# Patient Record
Sex: FEMALE | Race: WHITE | NOT HISPANIC OR LATINO | Employment: FULL TIME | URBAN - METROPOLITAN AREA
[De-identification: names, ages, dates, MRNs, and addresses within clinical notes are randomized per-mention and may not be internally consistent; named-entity substitution may affect disease eponyms.]

---

## 2020-02-10 ENCOUNTER — TELEPHONE (OUTPATIENT)
Dept: SURGERY | Facility: CLINIC | Age: 50
End: 2020-02-10

## 2020-02-10 NOTE — TELEPHONE ENCOUNTER
Holding appt for patient with breast surgery and plastic surgery. Left voicemail for patient to return call

## 2020-02-12 ENCOUNTER — OFFICE VISIT (OUTPATIENT)
Dept: PLASTIC SURGERY | Facility: CLINIC | Age: 50
End: 2020-02-12
Payer: OTHER GOVERNMENT

## 2020-02-12 ENCOUNTER — OFFICE VISIT (OUTPATIENT)
Dept: SURGERY | Facility: CLINIC | Age: 50
End: 2020-02-12
Payer: OTHER GOVERNMENT

## 2020-02-12 VITALS
DIASTOLIC BLOOD PRESSURE: 72 MMHG | WEIGHT: 157.06 LBS | BODY MASS INDEX: 25.24 KG/M2 | SYSTOLIC BLOOD PRESSURE: 106 MMHG | HEART RATE: 89 BPM | HEIGHT: 66 IN | TEMPERATURE: 98 F

## 2020-02-12 DIAGNOSIS — Z85.3 PERSONAL HISTORY OF BREAST CANCER: Primary | ICD-10-CM

## 2020-02-12 PROCEDURE — 99203 OFFICE O/P NEW LOW 30 MIN: CPT | Mod: S$PBB,,, | Performed by: SURGERY

## 2020-02-12 PROCEDURE — 99203 PR OFFICE/OUTPT VISIT, NEW, LEVL III, 30-44 MIN: ICD-10-PCS | Mod: S$PBB,,, | Performed by: SURGERY

## 2020-02-12 PROCEDURE — 99999 PR PBB SHADOW E&M-EST. PATIENT-LVL I: CPT | Mod: PBBFAC,,, | Performed by: SURGERY

## 2020-02-12 PROCEDURE — 99213 OFFICE O/P EST LOW 20 MIN: CPT | Mod: PBBFAC | Performed by: SURGERY

## 2020-02-12 PROCEDURE — 99999 PR PBB SHADOW E&M-EST. PATIENT-LVL III: CPT | Mod: PBBFAC,,, | Performed by: SURGERY

## 2020-02-12 PROCEDURE — 99204 PR OFFICE/OUTPT VISIT, NEW, LEVL IV, 45-59 MIN: ICD-10-PCS | Mod: S$PBB,,, | Performed by: SURGERY

## 2020-02-12 PROCEDURE — 99999 PR PBB SHADOW E&M-EST. PATIENT-LVL III: ICD-10-PCS | Mod: PBBFAC,,, | Performed by: SURGERY

## 2020-02-12 PROCEDURE — 99211 OFF/OP EST MAY X REQ PHY/QHP: CPT | Mod: PBBFAC,27 | Performed by: SURGERY

## 2020-02-12 PROCEDURE — 99204 OFFICE O/P NEW MOD 45 MIN: CPT | Mod: S$PBB,,, | Performed by: SURGERY

## 2020-02-12 PROCEDURE — 99999 PR PBB SHADOW E&M-EST. PATIENT-LVL I: ICD-10-PCS | Mod: PBBFAC,,, | Performed by: SURGERY

## 2020-02-12 NOTE — LETTER
February 20, 2020      Sam Morse, DO  [address] [facility]  [address]  [employer]  [place] [address]           [facility]  [address]  [facility] [address]  Phone: [phone]  Fax: [phone]          Patient: Ivon Carrillo   MR Number: [id number]   YOB: 1970   Date of Visit: 2/12/2020       Dear Dr. Sam Morse:    Thank you for referring Ivon Carrillo to me for evaluation. Attached you will find relevant portions of my assessment and plan of care.    If you have questions, please do not hesitate to call me. I look forward to following Ivon Carrillo along with you.    Sincerely,    Gracie Lucas MD      Enclosure  CC:  No Recipients    If you would like to receive this communication electronically, please contact [email or web address] or [phone] to request more information on [employer] access.    For providers and/or their staff who would like to refer a patient to [facility], please contact us through our one-stop-shop provider referral line, [facility], at [phone].    If you feel you have received this communication in error or would no longer like to receive these types of communications, please e-mail [email or web address]

## 2020-02-12 NOTE — PROGRESS NOTES
New Breast Cancer  History and Physical  [facility]  Department of Surgery    REFERRING PROVIDER: Sam Morse, DO  [address] [facility]  [address]  [place] [address]    CHIEF COMPLAINT: PMH of breast cancer    Subjective:      Ivon Carrillo is a 49 y.o. postmenopausal female referred for surgical evaluation. 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 She underwent a lumpectomy + radiation without endocrine therapy. In 2020 they found two spots of calcifications in the left breast near the lumpectomy site, she underwent a biopsy that came back as fat necrosis. Genetic testing was done, results are pending. She is interested in a bilateral mastectomy with reconstruction, she feels anxious every time she gets a mammogram. Strong family history of breast cancer in her father side. She is also unhappy with the asymmetry following her lumpectomy.    Patient does not routinely do self breast exams.  Patient does not have noted a change on breast exam.  Patient denies nipple discharge. Patient has to previous breast biopsy. Patient has a personal history of breast cancer.    GYN History:  Age of menarche was 18. Age of menopause was 48 (surgical).  Patient denies hormonal therapy. Patient is . Age of first live birth was 20. Patient did breast feed.    FAMILY History:  Maternal grandmother had thyroid cancer at 59  Maternal grandfather had brain cancer  Paternal aunt had breast cancer   Paternal cousin with breast cancer     Past Medical History:   Diagnosis Date    Breast cancer     IBS (irritable bowel syndrome)      Past Surgical History:   Procedure Laterality Date    BREAST BIOPSY      BREAST LUMPECTOMY      CHOLECYSTECTOMY      HAND SURGERY      HYSTERECTOMY       Current Outpatient Medications on File Prior to Visit   Medication Sig Dispense Refill    Lactobacillus rhamnosus GG (CULTURELLE) 10 billion cell capsule Take 1 capsule by mouth once  daily.      multivitamin capsule Take 1 capsule by mouth once daily.       No current facility-administered medications on file prior to visit.      Social History     Socioeconomic History    Marital status:      Spouse name: Not on file    Number of children: Not on file    Years of education: Not on file    Highest education level: Not on file   Occupational History    Not on file   Social Needs    Financial resource strain: Not on file    Food insecurity:     Worry: Not on file     Inability: Not on file    Transportation needs:     Medical: Not on file     Non-medical: Not on file   Tobacco Use    Smoking status: Never Smoker    Smokeless tobacco: Never Used   Substance and Sexual Activity    Alcohol use: Not Currently    Drug use: Never    Sexual activity: Not on file   Lifestyle    Physical activity:     Days per week: Not on file     Minutes per session: Not on file    Stress: Not on file   Relationships    Social connections:     Talks on phone: Not on file     Gets together: Not on file     Attends Mandaen service: Not on file     Active member of club or organization: Not on file     Attends meetings of clubs or organizations: Not on file     Relationship status: Not on file   Other Topics Concern    Not on file   Social History Narrative    Not on file     Family History   Problem Relation Age of Onset    Parkinsonism Father     Thyroid cancer Maternal Grandmother     Brain cancer Maternal Grandfather     Parkinsonism Paternal Grandmother     Stroke Paternal Grandfather         Review of Systems  Review of Systems   Constitutional: Negative for activity change and appetite change.   HENT: Negative for congestion.    Respiratory: Negative for apnea and choking.    Cardiovascular: Positive for palpitations. Negative for chest pain and leg swelling.   Gastrointestinal: Negative for abdominal distention and abdominal pain.   Endocrine: Negative for cold intolerance and heat  "intolerance.   Musculoskeletal: Negative for arthralgias and back pain.   Allergic/Immunologic: Negative for environmental allergies and food allergies.   Neurological: Negative for dizziness and facial asymmetry.   Psychiatric/Behavioral: Negative for agitation and behavioral problems.        Objective:   PHYSICAL EXAM:  /72 (BP Location: Left arm, Patient Position: Sitting, BP Method: Medium (Automatic))   Pulse 89   Temp 98 °F (36.7 °C) (Oral)   Ht 5' 6" (1.676 m)   Wt 71.2 kg (157 lb 1.2 oz)   BMI 25.35 kg/m²     Physical Exam   Constitutional: She appears well-developed and well-nourished.   HENT:   Head: Normocephalic.   Eyes: No scleral icterus.   Neck: Neck supple. No tracheal deviation present.   Cardiovascular: Normal rate and regular rhythm.    Pulmonary/Chest: Breath sounds normal. No respiratory distress. Right breast exhibits no inverted nipple, no mass, no nipple discharge and no skin change. Left breast exhibits no inverted nipple, no mass, no nipple discharge and no skin change.       Abdominal: Soft. She exhibits no mass. There is no tenderness.   Musculoskeletal: She exhibits no edema.   Lymphadenopathy:     She has no cervical adenopathy.   Neurological: She is alert.   Skin: No rash noted. No erythema.     Psychiatric: She has a normal mood and affect.         Radiology review: Images personally reviewed by me in the clinic.   Outside images Mammogram:  Two spots of calcifications seen in left breast. BIRADS 3.   Will have these reviewed her outside interpretation by our radiologist.    Assessment:      Ivon Carrillo is a 49 y.o. postmenopausal female with past medical history of left breast DCIS treated with lumpectomy and radiation.      Plan:   1. Will get a breast MRI since patient is under 50 and has a history of breast cancer.  This is appropriate for routine screening annually per ACR recommendations, but would require this in preparation for surgical planning especially if " considering nipple sparing mastectomy.  2. Will wait for genetic testing results.  I offered her to send this off today.  3. Referral to plastic surgery for reconstruction. Patient had radiation so probably will benefit from flap reconstruction instead of implants. She may also opt for fat injections or a contralateral lift rather than mastectomies.  4.  Unfortunately she is in the process of getting a divorce but emotionally seems to be handling things fairly well.  5.  She will let us know what she decides and how she would like us to proceed once the results of her MRI in genetic testing  have returned    Total time spent with the patient:  60 minutes.  Forty-five minutes of face to face consultation and 15 minutes of chart review and coordination of care.

## 2020-02-12 NOTE — PROGRESS NOTES
New Breast Cancer  History and Physical  [facility]  Department of Surgery    REFERRING PROVIDER: Sam Morse, DO  [address] [facility]  [address]  [place] [address]    CHIEF COMPLAINT: PMH of breast cancer    Subjective:      Ivon Carrillo is a 49 y.o. {MENOPAUSE:48597} female referred for surgical evaluation.     Patient {does/does not:77746} routinely do self breast exams.  Patient {hpi assoc has/has/not:10876} noted a change on breast exam.  Patient {denies/admits to:5300} nipple discharge. Patient {denies/admits to:5300} to previous breast biopsy. Patient {denies/admits to:5300} a personal history of breast cancer.      GYN History:  Age of menarche was {numbers; 8-17:75217}. Age of menopause was ***.  Patient {Actions; denies/admits to:5300} hormonal therapy. Patient is G{numbers; 0-10:74395}P{numbers; 0-10:51712}. Age of first live birth was ***. Patient {Desc; did/not:97140} breast feed.    FAMILY History:  ***    No past medical history on file.  No past surgical history on file.  Current Outpatient Medications on File Prior to Visit   Medication Sig Dispense Refill    Lactobacillus rhamnosus GG (CULTURELLE) 10 billion cell capsule Take 1 capsule by mouth once daily.      multivitamin capsule Take 1 capsule by mouth once daily.       No current facility-administered medications on file prior to visit.      Social History     Socioeconomic History    Marital status:      Spouse name: Not on file    Number of children: Not on file    Years of education: Not on file    Highest education level: Not on file   Occupational History    Not on file   Social Needs    Financial resource strain: Not on file    Food insecurity:     Worry: Not on file     Inability: Not on file    Transportation needs:     Medical: Not on file     Non-medical: Not on file   Tobacco Use    Smoking status: Never Smoker    Smokeless tobacco: Never Used   Substance and Sexual Activity    Alcohol  "use: Not Currently    Drug use: Never    Sexual activity: Not on file   Lifestyle    Physical activity:     Days per week: Not on file     Minutes per session: Not on file    Stress: Not on file   Relationships    Social connections:     Talks on phone: Not on file     Gets together: Not on file     Attends Gnosticism service: Not on file     Active member of club or organization: Not on file     Attends meetings of clubs or organizations: Not on file     Relationship status: Not on file   Other Topics Concern    Not on file   Social History Narrative    Not on file     No family history on file.     Review of Systems  Review of Systems     Objective:   PHYSICAL EXAM:  /72 (BP Location: Left arm, Patient Position: Sitting, BP Method: Medium (Automatic))   Pulse 89   Temp 98 °F (36.7 °C) (Oral)   Ht 5' 6" (1.676 m)   Wt 71.2 kg (157 lb 1.2 oz)   BMI 25.35 kg/m²     Physical Exam      Radiology review: Images personally reviewed by me in the clinic.   Mammogram:***  Ultrasound:***      Assessment:      Ivon Carrillo is a 49 y.o. {MENOPAUSE:06765} female with recently diagnosed carcinoma of the {LEFT/RIGHT/BI:99165} breast.      Plan:    Options for management were discussed with the patient and her family. We reviewed the existing data noting the equivalency of breast conserving surgery with radiation therapy and mastectomy. We also reviewed the guidelines of the National Comprehensive Cancer Network for Stage *** breast carcinoma. We discussed the need for lumpectomy margins to be negative for carcinoma, the necessity for postoperative radiation therapy after breast conservation in most cases, the possibility of a failed or false negative sentinel lymph node biopsy and the potential need for complete lymphadenectomy for a failed or positive sentinel lymph node biopsy were fully discussed. In the setting of mastectomy, delayed or immediate reconstruction options are available and were discussed. "     In the setting of lumpectomy, radiation therapy would be recommended majority of the time.  The duration and treatment side effects were discussed with the patient.  This will coordinated with the radiation oncologist pending final pathology.    We also discussed the role of systemic therapy in the treatment of early stage breast cancer.  We discussed that this is based on tumor biology and jazmyn status and will be determined based on final pathology.  We discussed that if the cancer is hormone positive, endocrine therapy would be recommended in most cases and its use can reduce the risk of recurrence as well as improve survival. Side effects of treatment were briefly discussed. We also discussed the potential role for chemotherapy based on a number of factors such as tumor phenotype (ER+ vs. triple negative vs. Khx8brj+) and this would be determined in coordination with the medical oncologist.    The patient, in consultation with her family, has elected to proceed with {partial/total:82404} {mastectomy procedure:92961}. The operative risks of bleeding, infection, recurrence, scarring, and anesthetic complications and the possibility of requiring further surgery were all noted and informed consent obtained.    Patient was educated on breast cancer, receptors, wire localization lumpectomy, mastectomy, sentinel lymph node mapping and biopsy, axillary lymph node dissection, reconstruction, breast prosthesis with post-mastectomy bra and radiation therapy. Patient was given patient information binder including [facility] breast cancer treatment brochure.  All her questions were answered.    Total time spent with the patient: *** minutes.  *** minutes of face to face consultation and *** minutes of chart review and coordination of care.

## 2020-02-12 NOTE — PROGRESS NOTES
Plastic Surgery History and Physical    HPI:  Ivon Carrillo 49 y.o. female with a history of DCIS s/p lumpectomy and radiation presents today to discuss reconstruction options if she were to under go bilateral mastectomy  or symmetry procedure if she does not.  She is currently awaiting genetic testing results to decide on course of action.      PMH:  Past Medical History:   Diagnosis Date    Breast cancer     IBS (irritable bowel syndrome)        PSH:  Past Surgical History:   Procedure Laterality Date    BREAST BIOPSY      BREAST LUMPECTOMY      CHOLECYSTECTOMY      HAND SURGERY      HYSTERECTOMY         SH:  Tobacco:   Social History     Tobacco Use   Smoking Status Never Smoker   Smokeless Tobacco Never Used     ETOH:   Social History     Substance and Sexual Activity   Alcohol Use Not Currently     Illicit Drugs:   Social History     Substance and Sexual Activity   Drug Use Never       Medications    Current Outpatient Medications:     Lactobacillus rhamnosus GG (CULTURELLE) 10 billion cell capsule, Take 1 capsule by mouth once daily., Disp: , Rfl:     multivitamin capsule, Take 1 capsule by mouth once daily., Disp: , Rfl:     Physical Exam  General: NAD, Resting comfortably in bed  Right breast slightly    Diagnostics:  No results found for: WBC, HGB, HCT, MCV, PLT  No results found for: CREATININE, BUN, NA, K, CL, CO2  No results found for: ALT, AST, GGT, ALKPHOS, BILITOT  No results found for: ALBUMIN      Assessment/Plan  Ivon Carrillo 49 y.o. female with beast cancer    1. Will plan for breast reconstruction once genetic testing results are back. Patient can return to clinic for further discussion.

## 2020-02-12 NOTE — LETTER
[facility]  [address]  [facility] [address]  Phone: [phone]  Fax: [phone] February 13, 2020      Sam Morse,   [address] [facility]  [address]  [facility]  [place] [address]    Patient: Ivon Carrillo   MR Number: [id number]   YOB: 1970   Date of Visit: 2/12/2020     Dear Dr. Sam Morse:    Thank you for referring Ivon Carrillo to me for evaluation. Below you will find relevant portions of my assessment and plan of care.    Ivon Carrillo 49-year-old female with a history of DCIS status post lumpectomy and radiation.  She presents today to discuss reconstruction options if she were to under go bilateral mastectomy  or symmetry procedure if she does not.  She is currently awaiting genetic testing results to decide on course of action.    If you have questions, please do not hesitate to call me. I look forward to following Ivon Carrillo along with you.    Sincerely,    Nando Neely MD  Section of Plastic Surgery  Department of Surgery  [facility]     CRB/hcr

## 2020-02-21 PROBLEM — Z85.3 PERSONAL HISTORY OF BREAST CANCER: Status: ACTIVE | Noted: 2020-02-21

## 2020-05-15 ENCOUNTER — TELEPHONE (OUTPATIENT)
Dept: SURGERY | Facility: CLINIC | Age: 50
End: 2020-05-15

## 2020-05-15 NOTE — TELEPHONE ENCOUNTER
Call to f/u on visit with Dr Lucas in Feb 2020. Need to check with pt to see if she received genetic test results. Also need to let pt know that elective surgeries are still not up and running again just yet.

## 2020-05-18 ENCOUNTER — TELEPHONE (OUTPATIENT)
Dept: SURGERY | Facility: CLINIC | Age: 50
End: 2020-05-18

## 2020-05-18 NOTE — TELEPHONE ENCOUNTER
Spoke to pt after receiving a message 5/15/2020. Inquired if pt was still looking to have her breast surgery. Per pt, has no insurance at present due to her divorce and everything on hold at this time. Advised pt to call back when she is able and ready to schedule surgery. Dr Lucas notified.